# Patient Record
Sex: MALE | Race: BLACK OR AFRICAN AMERICAN | NOT HISPANIC OR LATINO | Employment: STUDENT | URBAN - METROPOLITAN AREA
[De-identification: names, ages, dates, MRNs, and addresses within clinical notes are randomized per-mention and may not be internally consistent; named-entity substitution may affect disease eponyms.]

---

## 2023-07-12 ENCOUNTER — TELEPHONE (OUTPATIENT)
Dept: OTOLARYNGOLOGY | Facility: CLINIC | Age: 23
End: 2023-07-12
Payer: COMMERCIAL

## 2023-07-12 NOTE — TELEPHONE ENCOUNTER
----- Message from Craig Aguiar sent at 7/12/2023  3:26 PM CDT -----  Contact: ptelian Jackson at  [phone]  Type:  Sooner Appointment Request    Caller is requesting a sooner appointment.  Caller declined first available appointment listed below.  Caller will not accept being placed on the waitlist and is requesting a message be sent to doctor.    Name of Caller:  diana Jackson  When is the first available appointment?  9/11  Symptoms:  heavy congested  Best Call Back Number:  [phone]  Additional Information:  sammy'cortez Jackson is calling the office to schedule an appt for her son due to him being heavy congested and the date of 9/11 came up she states he needs to be seen sooner she would like for him top  be workes in on Friday.

## 2023-07-12 NOTE — TELEPHONE ENCOUNTER
----- Message from Craig Aguiar sent at 7/12/2023  3:26 PM CDT -----  Contact: ptelian Jackson at  [phone]  Type:  Sooner Appointment Request    Caller is requesting a sooner appointment.  Caller declined first available appointment listed below.  Caller will not accept being placed on the waitlist and is requesting a message be sent to doctor.    Name of Caller:  diana Jackson  When is the first available appointment?  9/11  Symptoms:  heavy congested  Best Call Back Number:  [phone]  Additional Information:  sammy'cortez Jackson is calling the office to schedule an appt for her son due to him being heavy congested and the date of 9/11 came up she states he needs to be seen sooner she would like for him top  be workes in on Friday.